# Patient Record
Sex: FEMALE | Race: WHITE | NOT HISPANIC OR LATINO | Employment: FULL TIME | URBAN - METROPOLITAN AREA
[De-identification: names, ages, dates, MRNs, and addresses within clinical notes are randomized per-mention and may not be internally consistent; named-entity substitution may affect disease eponyms.]

---

## 2017-04-18 PROBLEM — E11.69 DIABETES MELLITUS TYPE 2 IN OBESE: Status: ACTIVE | Noted: 2017-04-18

## 2017-04-18 PROBLEM — E66.9 DIABETES MELLITUS TYPE 2 IN OBESE: Status: ACTIVE | Noted: 2017-04-18

## 2017-04-18 PROBLEM — E66.01 OBESITY, CLASS III, BMI 40-49.9 (MORBID OBESITY): Status: ACTIVE | Noted: 2017-04-18

## 2017-04-18 PROBLEM — E66.813 OBESITY, CLASS III, BMI 40-49.9 (MORBID OBESITY): Status: ACTIVE | Noted: 2017-04-18

## 2017-05-18 PROBLEM — Z91.89 CANDIDATE FOR STATIN THERAPY DUE TO RISK OF FUTURE CARDIOVASCULAR EVENT: Status: ACTIVE | Noted: 2017-05-18

## 2018-07-11 ENCOUNTER — TELEPHONE (OUTPATIENT)
Dept: ADMINISTRATIVE | Facility: HOSPITAL | Age: 33
End: 2018-07-11

## 2019-04-12 ENCOUNTER — PATIENT OUTREACH (OUTPATIENT)
Dept: ADMINISTRATIVE | Facility: HOSPITAL | Age: 34
End: 2019-04-12

## 2020-08-05 ENCOUNTER — OFFICE VISIT (OUTPATIENT)
Dept: URGENT CARE | Facility: CLINIC | Age: 35
End: 2020-08-05
Payer: COMMERCIAL

## 2020-08-05 VITALS
OXYGEN SATURATION: 97 % | SYSTOLIC BLOOD PRESSURE: 164 MMHG | TEMPERATURE: 98 F | BODY MASS INDEX: 41.81 KG/M2 | DIASTOLIC BLOOD PRESSURE: 106 MMHG | WEIGHT: 207 LBS | HEART RATE: 95 BPM

## 2020-08-05 DIAGNOSIS — R03.0 ELEVATED BLOOD PRESSURE READING: ICD-10-CM

## 2020-08-05 DIAGNOSIS — Z11.59 ENCOUNTER FOR SCREENING FOR OTHER VIRAL DISEASES: Primary | ICD-10-CM

## 2020-08-05 DIAGNOSIS — J40 BRONCHITIS: ICD-10-CM

## 2020-08-05 PROCEDURE — 99214 PR OFFICE/OUTPT VISIT, EST, LEVL IV, 30-39 MIN: ICD-10-PCS | Mod: S$GLB,,, | Performed by: NURSE PRACTITIONER

## 2020-08-05 PROCEDURE — U0003 INFECTIOUS AGENT DETECTION BY NUCLEIC ACID (DNA OR RNA); SEVERE ACUTE RESPIRATORY SYNDROME CORONAVIRUS 2 (SARS-COV-2) (CORONAVIRUS DISEASE [COVID-19]), AMPLIFIED PROBE TECHNIQUE, MAKING USE OF HIGH THROUGHPUT TECHNOLOGIES AS DESCRIBED BY CMS-2020-01-R: HCPCS

## 2020-08-05 PROCEDURE — 99214 OFFICE O/P EST MOD 30 MIN: CPT | Mod: S$GLB,,, | Performed by: NURSE PRACTITIONER

## 2020-08-05 RX ORDER — AZITHROMYCIN 250 MG/1
TABLET, FILM COATED ORAL
Qty: 6 TABLET | Refills: 0 | Status: SHIPPED | OUTPATIENT
Start: 2020-08-05 | End: 2020-08-10

## 2020-08-05 NOTE — LETTER
[address] [facility] ? [place], [address] ? Phone [phone] ? Fax [phone]           Return to Work/School Status    Patient: Juliette Azul  YOB: 1985   Date: 08/05/2020      [facility] has adopted CDCs time-based return to work/school strategy for persons with confirmed or suspected COVID19. [facility] does not recommend using a test-based strategy for returning to work/school after COVID-19 infection. [facility] has reported prolonged positive test results without evidence of infectiousness. At this time, positive specimens capable of producing disease have not been isolated more than 9 days after onset of illness.   Symptomatic persons with confirmed COVID-19 or suspected COVID-19 can return to work/school after:    At least 3 days (72 hours) have passed since recovery defined as resolution of fever without the use of fever-reducing medications AND improvement in respiratory symptoms (e.g., cough, shortness of breath)    AND, at least 10 days have passed since first positive test  Asymptomatic persons with confirmed COVID-19 can return to work after:   At least 10 days have passed since the positive laboratory test and the person remains asymptomatic.  More information about the science behind the symptom-based return to work/school can be found at: https://www.cdc.gov/coronavirus/2019-ncov/community/jurrbsyr-eotoaihcats-wdcllczjj.html    Sincerely,    Carmelita Rizo, RONAL

## 2020-08-05 NOTE — PROGRESS NOTES
Subjective:       Patient ID: Juliette Azul is a 35 y.o. female.    Vitals:  weight is 93.9 kg (207 lb). Her oral temperature is 98.2 °F (36.8 °C). Her blood pressure is 164/106 (abnormal) and her pulse is 95. Her oxygen saturation is 97%.     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 The current episode started yesterday. The problem has been gradually worsening. The problem occurs every few minutes. The cough is productive of sputum. Associated symptoms include chills, headaches (frontal, nonradiating, mild intensity, and intermittent), nasal congestion, a sore throat and sweats. Pertinent negatives include no chest pain, ear congestion, ear pain, eye redness, fever, hemoptysis, myalgias, rash, shortness of breath, weight loss or wheezing. Nothing aggravates the symptoms. Treatments tried: advil cold, mucinex. The treatment provided mild relief. There is no history of asthma, bronchitis or pneumonia.       Constitution: Positive for chills and sweating. Negative for fatigue and fever.   HENT: Positive for congestion and sore throat. Negative for ear pain, sinus pain, sinus pressure, trouble swallowing and voice change.         No loss of taste or smell.   Neck: Negative for neck pain, neck stiffness and painful lymph nodes.   Cardiovascular: Negative for chest pain and sob on exertion.   Eyes: Negative for eye discharge, eye itching and eye redness.   Respiratory: Positive for cough and sputum production (green). Negative for chest tightness, bloody sputum, shortness of breath, wheezing and asthma.    Gastrointestinal: Positive for nausea and diarrhea (watery brown). Negative for abdominal pain, vomiting, bright red blood in stool, dark colored stools, rectal bleeding and rectal pain.   Genitourinary: Negative for dysuria, frequency, urgency, urine decreased, flank pain, hematuria and history of kidney stones.   Musculoskeletal: Negative  for joint pain, joint swelling and muscle ache.   Skin: Negative for pale, rash and erythema.   Allergic/Immunologic: Negative for seasonal allergies and asthma.   Neurological: Positive for headaches (frontal, nonradiating, mild intensity, and intermittent). Negative for light-headedness.   Hematologic/Lymphatic: Negative for swollen lymph nodes and easy bruising/bleeding. Does not bruise/bleed easily.       Objective:      Physical Exam   Constitutional: She is oriented to person, place, and time. She appears well-developed. She is cooperative.  Non-toxic appearance. She does not appear ill. No distress.   HENT:   Head: Normocephalic and atraumatic.   Ears:   Right Ear: Hearing, tympanic membrane, external ear and ear canal normal.   Left Ear: Hearing, tympanic membrane, external ear and ear canal normal.   Nose: Rhinorrhea present. No mucosal edema, purulent discharge or nasal deformity. No epistaxis. Right sinus exhibits no maxillary sinus tenderness and no frontal sinus tenderness. Left sinus exhibits no maxillary sinus tenderness and no frontal sinus tenderness.   Mouth/Throat: Uvula is midline and mucous membranes are normal. Mucous membranes are not pale. No trismus in the jaw. Normal dentition. No uvula swelling. Posterior oropharyngeal erythema present. No oropharyngeal exudate, posterior oropharyngeal edema, tonsillar abscesses or cobblestoning. Tonsils are 0 on the right. Tonsils are 0 on the left. No tonsillar exudate.   Airway patent.  Normal phonation.  Symmetrical soft palate elevation.  No trismus or difficulty tolerating oral secretions.      Comments: Airway patent.  Normal phonation.  Symmetrical soft palate elevation.  No trismus or difficulty tolerating oral secretions.  Eyes: Conjunctivae and lids are normal. Right eye exhibits no discharge. Left eye exhibits no discharge. No scleral icterus.   Neck: Trachea normal, normal range of motion, full passive range of motion without pain and  phonation normal. Neck supple. No muscular tenderness present. No neck rigidity. No edema and no erythema present.   Cardiovascular: Normal rate, regular rhythm, normal heart sounds and normal pulses.   Pulmonary/Chest: Effort normal and breath sounds normal. No respiratory distress. She has no decreased breath sounds. She has no wheezes. She has no rhonchi.    Comments: Respiratory rate 16, normal room air saturation.  Speaking in full sentences without difficulty.  No notable coughing at present.      Abdominal: Soft. Normal appearance. She exhibits no distension. There is no abdominal tenderness.   Musculoskeletal: Normal range of motion.         General: No deformity.   Lymphadenopathy:     She has no cervical adenopathy.   Neurological: She is alert and oriented to person, place, and time. She exhibits normal muscle tone. Coordination and gait normal.   Skin: Skin is warm, dry, intact, not diaphoretic and not pale. erythemaPsychiatric: Her speech is normal and behavior is normal. Mood, judgment and thought content normal.   Nursing note and vitals reviewed.        Assessment:       1. Encounter for screening for other viral diseases    2. Bronchitis    3. Elevated blood pressure reading        Plan:         Alert, nontoxic and in NAD. Afebrile.  Patient with no evidence of respiratory distress.  Patient with bronchitis.  Will test for COVID.  Advised on COVID testing, signs and symptoms of COVID, symptomatic management at home, signs and symptoms to seek emergency care, CDC quarantine guidelines for COVID.  Patient is diabetic, denies hypoglycemia symptoms.  Advised the importance of close her blood sugar monitoring at home during times of illness or stressed.  Blood pressure elevation noted, no associated symptoms, denies importance of PCP recheck.  Patient verbalized understanding and agreement treatment plan.    Encounter for screening for other viral diseases    Bronchitis  -     azithromycin (Z-BIANCA) 250 MG  tablet; Take 2 tablets by mouth on day 1; Take 1 tablet by mouth on days 2-5  Dispense: 6 tablet; Refill: 0    Elevated blood pressure reading         Patient Instructions   Elevated Blood Pressure  Your blood pressure was elevated during your visit to the urgent care today.  It was not so high that immediate care was needed, but it is recommended that you monitor your blood pressure over the next week or two to make sure that it is not staying elevated.  If you are on blood pressure medication currently, continue as already prescribed. Please have your blood pressure taken 2-3 times daily at different times of the day.  Keep a log of these blood pressure readings and take it with you to see your Primary Care Physician.  Bring today's discharge papers as well to your follow up appointment. If your blood pressure is consistently above 140/90, you should follow-up with your PCP without delay. If you develop chest pain, shortness of breath, dizziness, vision changes, or any other concerning symptoms, you should seek immediate care in the Emergency Department.    ·   · With diabetes, you should monitor your blood sugars during times of illness or stressed 3 to 4 times a day and as needed per symptoms.  Stressor illness can increase blood sugars as discussed.  Seek emergency care for any blood sugars over 300.  ·   ·     Bronchitis, Antibiotic Treatment (Adult)    Bronchitis is an infection of the air passages (bronchial tubes) in your lungs. It often occurs when you have a cold. This illness is contagious during the first few days and is spread through the air by coughing and sneezing, or by direct contact (touching the sick person and then touching your own eyes, nose, or mouth).  Symptoms of bronchitis include cough with mucus (phlegm) and low-grade fever. Bronchitis usually lasts 7 to 14 days. Mild cases can be treated with simple home remedies. More severe infection is treated with an antibiotic.  Home care  Follow  these guidelines when caring for yourself at home:  If your symptoms are severe, rest at home for the first 2 to 3 days. When you go back to your usual activities, don't let yourself get too tired.  Do not smoke. Also avoid being exposed to secondhand smoke.  You may use over-the-counter medicines to control fever or pain, unless another medicine was prescribed. (Note: If you have chronic liver or kidney disease or have ever had a stomach ulcer or gastrointestinal bleeding, talk with your healthcare provider before using these medicines. Also talk to your provider if you are taking medicine to prevent blood clots.) Aspirin should never be given to anyone younger than 18 years of age who is ill with a viral infection or fever. It may cause severe liver or brain damage.  Your appetite may be poor, so a light diet is fine. Avoid dehydration by drinking 6 to 8 glasses of fluids per day (such as water, soft drinks, sports drinks, juices, tea, or soup). Extra fluids will help loosen secretions in the nose and lungs.  Over-the-counter cough, cold, and sore-throat medicines will not shorten the length of the illness, but they may be helpful to reduce symptoms. (Note: Do not use decongestants if you have high blood pressure.)  Finish all antibiotic medicine. Do this even if you are feeling better after only a few days.  Follow-up care  Follow up with your healthcare provider, or as advised. If you had an X-ray or ECG (electrocardiogram), a specialist will review it. You will be notified of any new findings that may affect your care.  Note: If you are age 65 or older, or if you have a chronic lung disease or condition that affects your immune system, or you smoke, talk to your healthcare provider about having pneumococcal vaccinations and a yearly influenza vaccination (flu shot).  When to seek medical advice  Call your healthcare provider right away if any of these occur:  Fever of 100.4°F (38°C) or higher  Coughing up  increased amounts of colored sputum  Weakness, drowsiness, headache, facial pain, ear pain, or a stiff neck  Call 911, or get immediate medical care  Contact emergency services right away if any of these occur.  Coughing up blood  Worsening weakness, drowsiness, headache, or stiff neck  Trouble breathing, wheezing, or pain with breathing  Date Last Reviewed: 9/13/2015  © 1776-7103 [employer]. [address] [place] [address], [place] [address]. All rights reserved. This information is not intended as a substitute for professional medical care. Always follow your healthcare professional's instructions.      COVID 19-CORONA TREATMENT AS AN OUTPATIENT  I.  1) Motrin/advil/ibuprofen- Take Two Tablets(200 mg) three Times a Day for 5 to 7 Days if over 90 pounds.If under 90 pounds take one motrin 200 mg once or an equivalent Childrens liquid dose for the patients weight and age.  2) Mucinex D 1/2 Tablet twice a day for 5 to 7 Days.If under 80 pounds take a 1/4 of a tablet or get the Childrens Liquid dose for the patients age and/or weight.  3) Drink Hot Liquids(Coffee if an adult,WATER,Tea,Hot Chocolate,or Soup) that you put in a Mug place in Microwave for 2.5 to 3 minutes CHANGE THE CUP THAT WAS USED IN THE MICROWAVE SO AS NOT TO BURN YOUR MOUTH,then sniff the steam from the cup and sip the heated liquid TEN TO TWELVE TIMES A DAY for 5 to 7 Days.  4) These 3 things will help the antibiotics and other medications work faster and will speed your recovery!    II.  If COVID19-CORONA  is presumptive diagnosis then there are no medications that treat this virus to date.In order to lessen symptoms or  to turn off the INFLAMMATORY PATHWAYS you need to take :  1)Vitamin C 500 to 1000 mg by mouth once a day.  2)B1 (Thiamine)(or a B Complex tablet-The B complex vitamin includes all the B vitamins) vitamin one tablet once a day .  3)Vitamin D 2000 to 5000 IU once a day   4)If older than 25 take Asprin 325 (or two 81 mg Asprin )  once a day for 1 to 2 months.  5)Melatonin 3 to 6 mg by mouth at 6 to 7 2 hours before bedtime .(it will make you sleepy so do not drive after taking this medication)  6)Take zinc 220 mg by mouth every day .  7)Get a Pulse Oximeter to check the oxygen in your blood and pulse,then Check your oxygen and  pulse 4 to 6 times a day if it drops below 95% go to the Emergency Room .If you see a pulse above 110 and your not stressed or have done heavy work/activitity and your oxygen is above 95% then you need to drink more fluids if there are no other symptoms.  · 8)Probiotics to replace good bacteria lost with diarrhea.   ·   ·   ·   · Follow up with your primary care in 2-5 days if symptoms have not improved, or you may return here.  · If you were referred to a specialist, please follow up with that specialty.  · If you were prescribed antibiotics, please take them to completion.  · If you were prescribed a narcotic or any medication with sedative effects, do not drive or operate heavy equipment or machinery while taking these medications.  · You must understand that you have received treatment at an Urgent Care facility only, and that you may be released before all of your medical problems are known or treated. Urgent Care facilities are not equipped to handle life threatening emergencies. It is recommended that you go to an Emergency Department for further evaluation of worsening or concerning symptoms, or possibly life threatening conditions as discussed.                                        If you  smoke, please stop smoking

## 2020-08-05 NOTE — PATIENT INSTRUCTIONS
Elevated Blood Pressure  Your blood pressure was elevated during your visit to the urgent care today.  It was not so high that immediate care was needed, but it is recommended that you monitor your blood pressure over the next week or two to make sure that it is not staying elevated.  If you are on blood pressure medication currently, continue as already prescribed. Please have your blood pressure taken 2-3 times daily at different times of the day.  Keep a log of these blood pressure readings and take it with you to see your Primary Care Physician.  Bring today's discharge papers as well to your follow up appointment. If your blood pressure is consistently above 140/90, you should follow-up with your PCP without delay. If you develop chest pain, shortness of breath, dizziness, vision changes, or any other concerning symptoms, you should seek immediate care in the Emergency Department.    ·   · With diabetes, you should monitor your blood sugars during times of illness or stressed 3 to 4 times a day and as needed per symptoms.  Stressor illness can increase blood sugars as discussed.  Seek emergency care for any blood sugars over 300.  ·   ·     Bronchitis, Antibiotic Treatment (Adult)    Bronchitis is an infection of the air passages (bronchial tubes) in your lungs. It often occurs when you have a cold. This illness is contagious during the first few days and is spread through the air by coughing and sneezing, or by direct contact (touching the sick person and then touching your own eyes, nose, or mouth).  Symptoms of bronchitis include cough with mucus (phlegm) and low-grade fever. Bronchitis usually lasts 7 to 14 days. Mild cases can be treated with simple home remedies. More severe infection is treated with an antibiotic.  Home care  Follow these guidelines when caring for yourself at home:  If your symptoms are severe, rest at home for the first 2 to 3 days. When you go back to your usual activities, don't let  yourself get too tired.  Do not smoke. Also avoid being exposed to secondhand smoke.  You may use over-the-counter medicines to control fever or pain, unless another medicine was prescribed. (Note: If you have chronic liver or kidney disease or have ever had a stomach ulcer or gastrointestinal bleeding, talk with your healthcare provider before using these medicines. Also talk to your provider if you are taking medicine to prevent blood clots.) Aspirin should never be given to anyone younger than 18 years of age who is ill with a viral infection or fever. It may cause severe liver or brain damage.  Your appetite may be poor, so a light diet is fine. Avoid dehydration by drinking 6 to 8 glasses of fluids per day (such as water, soft drinks, sports drinks, juices, tea, or soup). Extra fluids will help loosen secretions in the nose and lungs.  Over-the-counter cough, cold, and sore-throat medicines will not shorten the length of the illness, but they may be helpful to reduce symptoms. (Note: Do not use decongestants if you have high blood pressure.)  Finish all antibiotic medicine. Do this even if you are feeling better after only a few days.  Follow-up care  Follow up with your healthcare provider, or as advised. If you had an X-ray or ECG (electrocardiogram), a specialist will review it. You will be notified of any new findings that may affect your care.  Note: If you are age 65 or older, or if you have a chronic lung disease or condition that affects your immune system, or you smoke, talk to your healthcare provider about having pneumococcal vaccinations and a yearly influenza vaccination (flu shot).  When to seek medical advice  Call your healthcare provider right away if any of these occur:  Fever of 100.4°F (38°C) or higher  Coughing up increased amounts of colored sputum  Weakness, drowsiness, headache, facial pain, ear pain, or a stiff neck  Call 911, or get immediate medical care  Contact emergency services  right away if any of these occur.  Coughing up blood  Worsening weakness, drowsiness, headache, or stiff neck  Trouble breathing, wheezing, or pain with breathing  Date Last Reviewed: 9/13/2015 © 2000-2017 [employer]. [address] [place] [address], [place] [address]. All rights reserved. This information is not intended as a substitute for professional medical care. Always follow your healthcare professional's instructions.      COVID 19-CORONA TREATMENT AS AN OUTPATIENT  I.  1) Motrin/advil/ibuprofen- Take Two Tablets(200 mg) three Times a Day for 5 to 7 Days if over 90 pounds.If under 90 pounds take one motrin 200 mg once or an equivalent Childrens liquid dose for the patients weight and age.  2) Mucinex D 1/2 Tablet twice a day for 5 to 7 Days.If under 80 pounds take a 1/4 of a tablet or get the Childrens Liquid dose for the patients age and/or weight.  3) Drink Hot Liquids(Coffee if an adult,WATER,Tea,Hot Chocolate,or Soup) that you put in a Mug place in Microwave for 2.5 to 3 minutes CHANGE THE CUP THAT WAS USED IN THE MICROWAVE SO AS NOT TO BURN YOUR MOUTH,then sniff the steam from the cup and sip the heated liquid TEN TO TWELVE TIMES A DAY for 5 to 7 Days.  4) These 3 things will help the antibiotics and other medications work faster and will speed your recovery!    II.  If COVID19-CORONA  is presumptive diagnosis then there are no medications that treat this virus to date.In order to lessen symptoms or  to turn off the INFLAMMATORY PATHWAYS you need to take :  1)Vitamin C 500 to 1000 mg by mouth once a day.  2)B1 (Thiamine)(or a B Complex tablet-The B complex vitamin includes all the B vitamins) vitamin one tablet once a day .  3)Vitamin D 2000 to 5000 IU once a day   4)If older than 25 take Asprin 325 (or two 81 mg Asprin ) once a day for 1 to 2 months.  5)Melatonin 3 to 6 mg by mouth at 6 to 7 2 hours before bedtime .(it will make you sleepy so do not drive after taking this medication)  6)Take  zinc 220 mg by mouth every day .  7)Get a Pulse Oximeter to check the oxygen in your blood and pulse,then Check your oxygen and  pulse 4 to 6 times a day if it drops below 95% go to the Emergency Room .If you see a pulse above 110 and your not stressed or have done heavy work/activitity and your oxygen is above 95% then you need to drink more fluids if there are no other symptoms.  · 8)Probiotics to replace good bacteria lost with diarrhea.   ·   ·   ·   · Follow up with your primary care in 2-5 days if symptoms have not improved, or you may return here.  · If you were referred to a specialist, please follow up with that specialty.  · If you were prescribed antibiotics, please take them to completion.  · If you were prescribed a narcotic or any medication with sedative effects, do not drive or operate heavy equipment or machinery while taking these medications.  · You must understand that you have received treatment at an Urgent Care facility only, and that you may be released before all of your medical problems are known or treated. Urgent Care facilities are not equipped to handle life threatening emergencies. It is recommended that you go to an Emergency Department for further evaluation of worsening or concerning symptoms, or possibly life threatening conditions as discussed.                                        If you  smoke, please stop smoking

## 2020-08-06 LAB — SARS-COV-2 RNA RESP QL NAA+PROBE: NOT DETECTED

## 2022-02-16 ENCOUNTER — PATIENT MESSAGE (OUTPATIENT)
Dept: ADMINISTRATIVE | Facility: HOSPITAL | Age: 37
End: 2022-02-16

## 2022-03-30 DIAGNOSIS — E11.9 TYPE 2 DIABETES MELLITUS WITHOUT COMPLICATION: ICD-10-CM

## 2022-04-04 ENCOUNTER — PATIENT MESSAGE (OUTPATIENT)
Dept: ADMINISTRATIVE | Facility: HOSPITAL | Age: 37
End: 2022-04-04

## 2022-07-11 ENCOUNTER — PATIENT MESSAGE (OUTPATIENT)
Dept: ADMINISTRATIVE | Facility: HOSPITAL | Age: 37
End: 2022-07-11

## 2022-10-03 ENCOUNTER — PATIENT MESSAGE (OUTPATIENT)
Dept: ADMINISTRATIVE | Facility: HOSPITAL | Age: 37
End: 2022-10-03

## 2023-04-03 ENCOUNTER — PATIENT MESSAGE (OUTPATIENT)
Dept: ADMINISTRATIVE | Facility: HOSPITAL | Age: 38
End: 2023-04-03

## 2023-06-07 DIAGNOSIS — Z12.31 OTHER SCREENING MAMMOGRAM: ICD-10-CM

## 2023-07-11 ENCOUNTER — PATIENT MESSAGE (OUTPATIENT)
Dept: ADMINISTRATIVE | Facility: HOSPITAL | Age: 38
End: 2023-07-11

## 2023-10-18 DIAGNOSIS — E11.9 TYPE 2 DIABETES MELLITUS WITHOUT COMPLICATION: ICD-10-CM

## 2023-11-08 DIAGNOSIS — E11.9 TYPE 2 DIABETES MELLITUS WITHOUT COMPLICATION: ICD-10-CM

## 2024-01-30 ENCOUNTER — HOSPITAL ENCOUNTER (OUTPATIENT)
Dept: RADIOLOGY | Facility: HOSPITAL | Age: 39
Discharge: HOME | End: 2024-01-30
Attending: NURSE PRACTITIONER
Payer: COMMERCIAL

## 2024-01-30 DIAGNOSIS — R92.8 ABNORMAL MAMMOGRAM: ICD-10-CM

## 2024-01-30 PROCEDURE — 77049 MRI BREAST C-+ W/CAD BI: CPT | Mod: 26,,, | Performed by: RADIOLOGY

## 2024-01-30 PROCEDURE — 25500020 PHARM REV CODE 255: Performed by: NURSE PRACTITIONER

## 2024-01-30 PROCEDURE — 77049 MRI BREAST C-+ W/CAD BI: CPT | Mod: TC

## 2024-01-30 PROCEDURE — A9577 INJ MULTIHANCE: HCPCS | Performed by: NURSE PRACTITIONER

## 2024-01-30 RX ADMIN — GADOBENATE DIMEGLUMINE 18 ML: 529 INJECTION, SOLUTION INTRAVENOUS at 04:01

## 2025-07-31 DIAGNOSIS — Z12.31 OTHER SCREENING MAMMOGRAM: ICD-10-CM
